# Patient Record
Sex: FEMALE
[De-identification: names, ages, dates, MRNs, and addresses within clinical notes are randomized per-mention and may not be internally consistent; named-entity substitution may affect disease eponyms.]

---

## 2023-01-13 ENCOUNTER — APPOINTMENT (OUTPATIENT)
Dept: ORTHOPEDIC SURGERY | Facility: CLINIC | Age: 58
End: 2023-01-13
Payer: COMMERCIAL

## 2023-01-13 PROBLEM — Z00.00 ENCOUNTER FOR PREVENTIVE HEALTH EXAMINATION: Status: ACTIVE | Noted: 2023-01-13

## 2023-01-13 PROCEDURE — 99203 OFFICE O/P NEW LOW 30 MIN: CPT

## 2023-01-13 PROCEDURE — 73562 X-RAY EXAM OF KNEE 3: CPT | Mod: RT

## 2023-01-13 NOTE — DATA REVIEWED
[FreeTextEntry1] : Obtained in office AP lateral and oblique right knee x-rays.  No significant arthritis.  No fractures.

## 2023-01-13 NOTE — DISCUSSION/SUMMARY
[de-identified] : 57-year-old female with right knee pain without specific injury.  I recommend she continue ice elevation anti-inflammatories and this will self resolve.  I also advised her to do physical therapy in the future or to do exercises to strengthen the knee.

## 2023-01-13 NOTE — PHYSICAL EXAM
[de-identified] : Right knee: No stress with valgus or varus exam.  Negative Lachman negative posterior drawer mild effusion mild joint line tenderness just medially.

## 2023-01-13 NOTE — HISTORY OF PRESENT ILLNESS
[de-identified] : 57-year-old female presents with right knee pain.  This has been going on since Monday.  She denies any specific injury.  [place] city.  She has not been strep.  She has never had injury to her knee before.  She has iced and elevated taken anti-inflammatories.  She denies fevers or chills.

## 2023-01-25 ENCOUNTER — APPOINTMENT (OUTPATIENT)
Dept: ORTHOPEDIC SURGERY | Facility: CLINIC | Age: 58
End: 2023-01-25
Payer: COMMERCIAL

## 2023-01-25 ENCOUNTER — NON-APPOINTMENT (OUTPATIENT)
Age: 58
End: 2023-01-25

## 2023-01-25 DIAGNOSIS — S83.249A OTHER TEAR OF MEDIAL MENISCUS, CURRENT INJURY, UNSPECIFIED KNEE, INITIAL ENCOUNTER: ICD-10-CM

## 2023-01-25 PROCEDURE — 99214 OFFICE O/P EST MOD 30 MIN: CPT

## 2023-01-25 NOTE — HISTORY OF PRESENT ILLNESS
[de-identified] : 57-year-old female presents with RT knee pain.  This has been going on since 01/09/23.  She denies any specific injury. Her occupation is a  [place]. She has never had injury to her knee before.  She has used cold compress and elevated her RT leg as well as taken anti-inflammatories. The swelling has gone down.  On the pain scale, at rest the pain is a 1 and with activity it can vary between 3 and 9 depending on how her knee is positioned. She has been taking naproxen and Advil which have not been helping.\par \par Denies any medical conditions. \par \par X-ray done in office on 01/13/23 , reviewed and analyzed. Shows no fracture, arthritis, or soft tissue calcifications.\par \par On exam decreased ROM, tender to palpation Over the medial plica area, and positive Earlene's with a click medial compartment ligaments are stable small knee effusion\par \par Recommending a RT knee MRI to rule out a meniscal tear and plica, and formal therapy with home program. will see her back in 4-6 weeks

## 2023-02-02 ENCOUNTER — APPOINTMENT (OUTPATIENT)
Dept: MRI IMAGING | Facility: CLINIC | Age: 58
End: 2023-02-02
Payer: COMMERCIAL

## 2023-02-02 PROCEDURE — 73721 MRI JNT OF LWR EXTRE W/O DYE: CPT | Mod: RT

## 2023-02-15 ENCOUNTER — NON-APPOINTMENT (OUTPATIENT)
Age: 58
End: 2023-02-15

## 2023-02-15 ENCOUNTER — APPOINTMENT (OUTPATIENT)
Dept: ORTHOPEDIC SURGERY | Facility: CLINIC | Age: 58
End: 2023-02-15
Payer: COMMERCIAL

## 2023-02-15 DIAGNOSIS — S80.00XA CONTUSION OF UNSPECIFIED KNEE, INITIAL ENCOUNTER: ICD-10-CM

## 2023-02-15 PROCEDURE — 99214 OFFICE O/P EST MOD 30 MIN: CPT

## 2023-02-15 NOTE — HISTORY OF PRESENT ILLNESS
[de-identified] : 57-year-old female presents for re-evaluation of her RT knee pain.  This has been going on since 01/09/23.  She denies any specific injury. Her occupation [place] a  [place]. She states the pain is 85% better.\par \par Denies any medical conditions. \par \par MRI of RT hand done on 02/02/2023, shows no evidence of tears. Shows significant marrow edema in the medial femoral condyle particular along the periphery where there is also slight subchondral sclerosis with slight MCL sprain.\par \par On exam good range of motion no effusion knee stable\par \par Recommending she continue to rest and take the elevator when she can to avoid stairs. She will follow-up as needed. Recommended a better nutrition program by eating until satisfaction, choosing healthier alternatives, and proper portion control.  Since he is 85% better we will let her do activities as tolerated but no jumping activities no excessive stairs or walking or recreational walking

## 2023-07-05 ENCOUNTER — APPOINTMENT (OUTPATIENT)
Dept: CV DIAGNOSITCS | Facility: HOSPITAL | Age: 58
End: 2023-07-05

## 2024-12-02 ENCOUNTER — EMERGENCY (EMERGENCY)
Facility: HOSPITAL | Age: 59
LOS: 0 days | Discharge: HOME | End: 2024-12-02
Attending: EMERGENCY MEDICINE
Payer: COMMERCIAL

## 2024-12-02 VITALS
DIASTOLIC BLOOD PRESSURE: 76 MMHG | OXYGEN SATURATION: 96 % | RESPIRATION RATE: 18 BRPM | HEART RATE: 108 BPM | SYSTOLIC BLOOD PRESSURE: 148 MMHG | WEIGHT: 186.07 LBS | TEMPERATURE: 98 F | HEIGHT: 64 IN

## 2024-12-02 DIAGNOSIS — K59.00 CONSTIPATION, UNSPECIFIED: ICD-10-CM

## 2024-12-02 DIAGNOSIS — R14.3 FLATULENCE: ICD-10-CM

## 2024-12-02 DIAGNOSIS — Z87.19 PERSONAL HISTORY OF OTHER DISEASES OF THE DIGESTIVE SYSTEM: ICD-10-CM

## 2024-12-02 DIAGNOSIS — K62.5 HEMORRHAGE OF ANUS AND RECTUM: ICD-10-CM

## 2024-12-02 PROCEDURE — 99283 EMERGENCY DEPT VISIT LOW MDM: CPT

## 2024-12-02 RX ORDER — MAGNESIUM CITRATE
296 SOLUTION, ORAL ORAL ONCE
Refills: 0 | Status: COMPLETED | OUTPATIENT
Start: 2024-12-02 | End: 2024-12-02

## 2024-12-02 RX ORDER — DOCUSATE SODIUM 100 MG
1 CAPSULE ORAL
Qty: 7 | Refills: 0
Start: 2024-12-02 | End: 2024-12-08

## 2024-12-02 RX ORDER — POLYETHYLENE GLYCOL 3350 17 G/17G
17 POWDER, FOR SOLUTION ORAL
Qty: 1 | Refills: 0
Start: 2024-12-02 | End: 2024-12-08

## 2024-12-02 RX ADMIN — Medication 296 MILLILITER(S): at 12:53

## 2024-12-02 RX ADMIN — Medication 1 ENEMA: at 12:53

## 2024-12-02 NOTE — ED PROVIDER NOTE - PATIENT PORTAL LINK FT
You can access the FollowMyHealth Patient Portal offered by [facility] by registering at the following website: [email or web address]. By joining [employer]’s FollowMyHealth portal, you will also be able to view your health information using other applications (apps) compatible with our system.

## 2024-12-02 NOTE — ED PROVIDER NOTE - OBJECTIVE STATEMENT
59-year-old female PMH external hemorrhoids presents to the ED for evaluation of constipation x 3 days.  States she began experiencing rectal pressure this morning.  Patient also reports a small amount of bright red blood in the toilet bowl after a bowel movement and on toilet paper when she wipes.  Patient reports she is still passing flatus.  Denies fevers, chills, chest pain, shortness of breath, abdominal pain, nausea, vomiting or any urinary symptoms.  Patient states she had a colonoscopy done in 2022 and was normal.

## 2024-12-02 NOTE — ED ADULT NURSE NOTE - OBJECTIVE STATEMENT
PT from home with C/O abdomen pain and  rectal pain ,pt report is constipated on and off for 1 week. ,denies N/V  no chest pain no fever or chills .

## 2024-12-02 NOTE — ED PROVIDER NOTE - NSFOLLOWUPINSTRUCTIONS_ED_ALL_ED_FT
PATION - General Information    Constipation    WHAT YOU NEED TO KNOW:    What is constipation? Constipation means you have hard, dry bowel movements, or you go longer than usual between bowel movements.    What causes constipation?    Not enough water or high-fiber foods    Lack of physical activity    Certain medicines, such as opioid pain medicine, antidepressants, or high blood pressure medicine    A medical condition such as hemorrhoids, diabetes, or a stroke  What are the signs and symptoms of constipation?    Trouble pushing out your bowel movement    Pain or bleeding during your bowel movement    A feeling that you did not finish having your bowel movement    Bloating  How is constipation diagnosed? Your healthcare provider will ask about your symptoms and examine you. Tell the provider how often you have a bowel movement and if it is painful. Your provider may ask about your eating habits and if you exercise. Tell the provider about any medicines you take, including fiber supplements. You may need an x-ray of your abdomen. This will help your provider see if you have constipation.    How is constipation treated? Medicine can help you have a bowel movement more easily. Medicines may increase moisture in your bowel movement or increase the motion of your intestines.    A suppository may be used to help soften your bowel movements. This may make them easier to pass. A suppository is guided into your rectum through your anus.      Laxatives can help stimulate your bowels to have a bowel movement. Your provider may recommend you only use laxatives for a short time. Long-term use can damage your bowel function over time.    An enema is liquid medicine used to clear bowel movement from your rectum. The medicine is put into your rectum through your anus.    What can I do to prevent constipation?    Drink liquids as directed. You may need to drink extra liquids to help soften and move your bowels. Ask how much liquid to drink each day and which liquids are best for you.    Eat high-fiber foods. This may help decrease constipation by adding bulk to your bowel movements. High-fiber foods include fruit, vegetables, whole-grain breads and cereals, and beans. Your healthcare provider or dietitian can help you create a high-fiber meal plan. Your provider may also recommend a fiber supplement if you cannot get enough fiber from food.      Exercise regularly. Regular physical activity can help stimulate your intestines. Walking is a good exercise to prevent or relieve constipation. Ask which exercises are best for you.   FAMILY WALKING FOR EXERCISE      Schedule a time each day to have a bowel movement. This may help train your body to have regular bowel movements. Bend forward while you are on the toilet to help move the bowel movement out. Sit on the toilet for at least 10 minutes, even if you do not have a bowel movement.    Talk to your healthcare provider about your medicines. Certain medicines, such as opioids, can cause constipation. Your provider may be able to make medicine changes. For example, he or she may change the kind of medicine, or change when you take it.    SEEK IMMEDIATE MEDICAL CARE IF YOU HAVE ANY OF THE FOLLOWING SYMPTOMS: bright red blood in your stool, constipation for longer than 4 days, abdominal or rectal pain, unexplained weight loss, or inability to pass gas.

## 2024-12-02 NOTE — ED PROVIDER NOTE - ATTENDING CONTRIBUTION TO CARE
59-year-old female no significant past medical history complaining of constipation for the past 3 days.  Positive rectal pressure.  No nausea, vomiting.  No abdominal pain.  Patient reports she had a colonoscopy 2022 which was reportedly normal.  Patient also reports she is noticing a small amount of blood on the toilet paper over the past few days.  No dizziness, palpitations, weakness, fatigue.  No chest pain, shortness of breath, dyspnea on exertion.  Patient with no history of constipation.  Past surgical history–none.  CONSTITUTIONAL: Well-appearing; well-nourished; in no apparent distress.   HEAD: Normocephalic; atraumatic.   EYES: PERRL; EOM intact. Conjunctiva normal B/L.   ENT: Normal pharynx with no tonsillar hypertrophy. MMM.  NECK: Supple; non-tender; no cervical lymphadenopathy.   CHEST: Normal chest excursion with respiration.   CARDIOVASCULAR: Normal S1, S2; no murmurs, rubs, or gallops.   RESPIRATORY: Normal chest excursion with respiration; breath sounds clear and equal bilaterally; no wheezes, rhonchi, or rales.  GI/: Normal bowel sounds; non-distended; non-tender.

## 2024-12-02 NOTE — ED PROVIDER NOTE - CLINICAL SUMMARY MEDICAL DECISION MAKING FREE TEXT BOX
59-year-old female no significant past medical history complaining of 3 days of constipation and rectal pressure.  Exam normal.  Patient had a large bowel movement in the ED with resolution of symptoms.  Patient instructed on bowel regimen and will follow-up with PMD.

## 2024-12-02 NOTE — ED PROVIDER NOTE - PROGRESS NOTE DETAILS
Patient had bowel movement after meds and improvement in discomfort. Will DC home with bowel regimen.

## 2024-12-02 NOTE — ED ADULT NURSE REASSESSMENT NOTE - NS ED NURSE REASSESS COMMENT FT1
Pt reassessed report felling mu ch better after fleet enema is given did tolerated well ,pt is seen evaluate by ED attending clear to go home ready to be D/C home with privet transport .

## 2024-12-02 NOTE — ED PROVIDER NOTE - PHYSICAL EXAMINATION
VITAL SIGNS: I have reviewed nursing notes and confirm.  CONSTITUTIONAL: uncomfortable-appearing, NAD  SKIN: Warm dry  HEAD: NCAT  EYES: EOMI, PERRL  ENT: Moist mucous membranes  NECK: Supple; non tender.   CARD: RRR  RESP: clear to ausculation b/l.    ABD: soft, non-tender, non-distended, no rebound or guarding.  RECTAL: Normal sphincter tone; no external hemorrhoids. Small fissure; vault without blood  EXT: Full ROM, no bony tenderness, no pedal edema, no calf tenderness  NEURO: Grossly intact. Normal gait.  PSYCH: Cooperative, appropriate.